# Patient Record
Sex: MALE | Race: WHITE | URBAN - METROPOLITAN AREA
[De-identification: names, ages, dates, MRNs, and addresses within clinical notes are randomized per-mention and may not be internally consistent; named-entity substitution may affect disease eponyms.]

---

## 2022-05-12 ENCOUNTER — OFFICE VISIT (OUTPATIENT)
Dept: URBAN - METROPOLITAN AREA CLINIC 26 | Facility: CLINIC | Age: 67
End: 2022-05-12
Payer: OTHER MISCELLANEOUS

## 2022-05-12 DIAGNOSIS — H25.813 COMBINED FORMS OF AGE-RELATED CATARACT, BILATERAL: Primary | ICD-10-CM

## 2022-05-12 DIAGNOSIS — H35.62 RETINAL HEMORRHAGE, LEFT EYE: ICD-10-CM

## 2022-05-12 DIAGNOSIS — H40.051 OCULAR HYPERTENSION, RIGHT EYE: ICD-10-CM

## 2022-05-12 PROCEDURE — 99204 OFFICE O/P NEW MOD 45 MIN: CPT | Performed by: OPTOMETRIST

## 2022-05-12 ASSESSMENT — INTRAOCULAR PRESSURE
OS: 12
OD: 13

## 2022-05-12 ASSESSMENT — KERATOMETRY
OD: 42.75
OS: 42.50

## 2022-05-12 ASSESSMENT — VISUAL ACUITY
OS: 20/30
OD: 20/40

## 2022-05-12 NOTE — IMPRESSION/PLAN
Impression: Combined forms of age-related cataract, bilateral: H25.813. Plan:  Discussed cataracts with patient. Discussed treatment options. Surgical treatment is recommended. Surgical risks and benefits discussed. Patient elects surgical treatment. Recommend surgery OU, OD first. Patient is candidate/interested in multifocal, toric, standard, LenSx and ORA. Aim OD: -0.25. Aim OS: -0.25. Consider MIGS. Glaucoma surgeon recommended.

## 2022-05-12 NOTE — IMPRESSION/PLAN
Impression: Retinal hemorrhage, left eye: H35.62. Plan: OCT of macula ordered and performed today ou. Macula normal ou. No DM. Cont BP  control. monitor without tx at this time.

## 2022-05-12 NOTE — IMPRESSION/PLAN
Impression: Ocular hypertension, right eye: H40.051. Plan: Pt has been using glc drops OD from outside doctor. Optic nerves appear normal ou. Cont Latanoprost 1gt qhs OD for now. Consider MIGS with Cat sx.

## 2022-07-28 ENCOUNTER — TESTING ONLY (OUTPATIENT)
Dept: URBAN - METROPOLITAN AREA CLINIC 24 | Facility: CLINIC | Age: 67
End: 2022-07-28
Payer: OTHER MISCELLANEOUS

## 2022-07-28 DIAGNOSIS — H40.051 OCULAR HYPERTENSION, RIGHT EYE: Primary | ICD-10-CM

## 2022-07-29 ENCOUNTER — ADULT PHYSICAL (OUTPATIENT)
Dept: URBAN - METROPOLITAN AREA CLINIC 24 | Facility: CLINIC | Age: 67
End: 2022-07-29
Payer: OTHER MISCELLANEOUS

## 2022-07-29 DIAGNOSIS — H25.813 COMBINED FORMS OF AGE-RELATED CATARACT, BILATERAL: Primary | ICD-10-CM

## 2022-07-29 DIAGNOSIS — Z01.818 ENCOUNTER FOR OTHER PREPROCEDURAL EXAMINATION: Primary | ICD-10-CM

## 2022-07-29 PROCEDURE — 99203 OFFICE O/P NEW LOW 30 MIN: CPT | Performed by: PHYSICIAN ASSISTANT

## 2022-07-29 ASSESSMENT — PACHYMETRY
OD: 27.81
OS: 27.57
OS: 3.62
OD: 3.61

## 2022-08-03 ENCOUNTER — PRE-OPERATIVE VISIT (OUTPATIENT)
Dept: URBAN - METROPOLITAN AREA CLINIC 24 | Facility: CLINIC | Age: 67
End: 2022-08-03
Payer: OTHER MISCELLANEOUS

## 2022-08-03 DIAGNOSIS — H35.62 RETINAL HEMORRHAGE, LEFT EYE: ICD-10-CM

## 2022-08-03 DIAGNOSIS — H52.13 MYOPIA, BILATERAL: ICD-10-CM

## 2022-08-03 DIAGNOSIS — H40.051 OCULAR HYPERTENSION, RIGHT EYE: ICD-10-CM

## 2022-08-03 DIAGNOSIS — H40.1134 PRIMARY OPEN-ANGLE GLAUCOMA, INDETERMINATE, BILATERAL: ICD-10-CM

## 2022-08-03 DIAGNOSIS — H25.813 COMBINED FORMS OF AGE-RELATED CATARACT, BILATERAL: Primary | ICD-10-CM

## 2022-08-03 DIAGNOSIS — H52.203 BILATERAL ASTIGMATISM: ICD-10-CM

## 2022-08-03 DIAGNOSIS — H52.201 ASTIGMATISM OF RIGHT EYE: ICD-10-CM

## 2022-08-03 PROCEDURE — 92020 GONIOSCOPY: CPT | Performed by: OPHTHALMOLOGY

## 2022-08-03 PROCEDURE — 99204 OFFICE O/P NEW MOD 45 MIN: CPT | Performed by: OPHTHALMOLOGY

## 2022-08-03 ASSESSMENT — VISUAL ACUITY
OS: 20/30
OD: 20/40

## 2022-08-03 ASSESSMENT — INTRAOCULAR PRESSURE
OS: 13
OD: 13
OD: 19

## 2022-08-03 NOTE — IMPRESSION/PLAN
Impression: Combined forms of age-related cataract, bilateral: H25.813. Plan: (( AIM:  - 2.00  OU: INJECTABLE OU (DEXYCU 1st choice), Epi, possible stretch, NO ORA OU, NO  LRI OU, declined AMP,    MIGS OU  (KDB 1st, Istent as backup) do not tape head, Glaucoma coverage, NOTE LENS TYPE:  [id number] ,DENSE, cortical possible trypan,  PSC)) Discussed cataract diagnosis with the patient. Appropriate testing ordered for cataract diagnosis prior to Preop. Risks and benefits of surgical treatment were discussed and understood. Patient desires surgical treatment. Discussed lens options with pt and pt is ok with wearing glasses after surgery. Patient desires surgery to proceed with surgery OU, OD  First. Patient desires near aim ou . Both eyes examined, medically necessary due to impact in activities of daily living. Discussed with pt limitations of MIGS device and it does not replace  Glaucoma eye drops and would have to continue treatment and would still need glasses after surgery. Discussed higher risks with smaller pupil and discussed iris stretch and higher risks of bleeding. Understands higher risk of complication and delayed healing due to dense cataract.

## 2022-08-03 NOTE — IMPRESSION/PLAN
Impression: Astigmatism of right eye: H52.201. Plan: Declined AMP, Discussed with patient that will need glasses after surgery. Discussed with patient, understands this may limit vision after surgery. Also discussed unmasking of astigmatism.

## 2022-08-03 NOTE — IMPRESSION/PLAN
FYI DOS - 1/24/2022  Received: Today  JUAN J Christie Ort Nurse [facility] [place]  Surgery rescheduled to 01/24/2022 from 01/10/2022.       Thanks,     Ashely      Impression: Myopia, bilateral: H52.13. Plan: Discussed signs and symptoms of retinal detachment (flashes,floaters, curtain) as precaution. Patient instructed to call or return to clinic if condition gets worse. Discussed with patient, understands this may limit vision after surgery.

## 2022-08-03 NOTE — IMPRESSION/PLAN
Impression: Primary open-angle glaucoma, indeterminate, bilateral: H40.1224. - susp for pig disp - Family Hx of Glaucoma is unknown , Denies Sulfa Allergy,  Heart or Lung Problems, Sleep Apnea,  Hx of Migraines Plan: PLAN: On Latanoprost OD QHS  , test reviewed, IOP is  higher ou, so make xal qhs ou now and so may proceed with cataract surgery with MIGS ou (KDB 1st, Istent at backup) and Discussed glaucoma may limit vision after surgery, may proceed with migs   in hopes of better iop control - understands does not eliminate meds. Discussed possible unmasking of scotoma after surgery. TESTS: Reviewed Discussed Glaucoma diagnosis in detail with patient. Emphasized and explain complaince. poor compliance can lead to Blindness.

## 2022-08-03 NOTE — IMPRESSION/PLAN
Impression: Retinal hemorrhage, left eye: H35.62. Plan: Monitor:  Discussed with patient, understands this may limit vision after surgery.

## 2022-08-10 ENCOUNTER — SURGERY (OUTPATIENT)
Dept: URBAN - METROPOLITAN AREA SURGERY 12 | Facility: SURGERY | Age: 67
End: 2022-08-10
Payer: OTHER MISCELLANEOUS

## 2022-08-10 DIAGNOSIS — H25.813 COMBINED FORMS OF AGE-RELATED CATARACT, BILATERAL: Primary | ICD-10-CM

## 2022-08-10 DIAGNOSIS — H40.1134 PRIMARY OPEN-ANGLE GLAUCOMA, BILATERAL, INDETERMINATE STAGE: ICD-10-CM

## 2022-08-11 ENCOUNTER — POST-OPERATIVE VISIT (OUTPATIENT)
Dept: URBAN - METROPOLITAN AREA CLINIC 26 | Facility: CLINIC | Age: 67
End: 2022-08-11
Payer: OTHER MISCELLANEOUS

## 2022-08-11 DIAGNOSIS — Z48.810 ENCOUNTER FOR SURGICAL AFTERCARE FOLLOWING SURGERY ON A SENSE ORGAN: Primary | ICD-10-CM

## 2022-08-11 PROCEDURE — 99024 POSTOP FOLLOW-UP VISIT: CPT | Performed by: OPTOMETRIST

## 2022-08-11 ASSESSMENT — INTRAOCULAR PRESSURE: OD: 14

## 2022-08-11 NOTE — IMPRESSION/PLAN
Impression: S/P Cataract Extraction by phacoemulsification with IOL placement; KDB OD - 1 Day. Encounter for surgical aftercare following surgery on a sense organ  Z48.950. Plan: monitor  Discontinue Lataoprost OD

## 2022-08-17 ENCOUNTER — POST-OPERATIVE VISIT (OUTPATIENT)
Dept: URBAN - METROPOLITAN AREA CLINIC 26 | Facility: CLINIC | Age: 67
End: 2022-08-17
Payer: OTHER MISCELLANEOUS

## 2022-08-17 DIAGNOSIS — H25.812 COMBINED FORMS OF AGE-RELATED CATARACT, LEFT EYE: ICD-10-CM

## 2022-08-17 DIAGNOSIS — Z48.810 ENCOUNTER FOR SURGICAL AFTERCARE FOLLOWING SURGERY ON A SENSE ORGAN: Primary | ICD-10-CM

## 2022-08-17 PROCEDURE — 99024 POSTOP FOLLOW-UP VISIT: CPT | Performed by: OPTOMETRIST

## 2022-08-17 ASSESSMENT — VISUAL ACUITY
OD: 20/25
OS: 20/25

## 2022-08-17 ASSESSMENT — INTRAOCULAR PRESSURE
OS: 10
OD: 10

## 2022-08-17 NOTE — IMPRESSION/PLAN
Impression: S/P Cataract Extraction by phacoemulsification with IOL placement; KDB OD - 7 Days. Encounter for surgical aftercare following surgery on a sense organ  Z48.810.  Plan: ok to proceed with 2nd eye

## 2022-08-25 ENCOUNTER — POST-OPERATIVE VISIT (OUTPATIENT)
Dept: URBAN - METROPOLITAN AREA CLINIC 26 | Facility: CLINIC | Age: 67
End: 2022-08-25
Payer: OTHER MISCELLANEOUS

## 2022-08-25 DIAGNOSIS — Z96.1 PRESENCE OF INTRAOCULAR LENS: Primary | ICD-10-CM

## 2022-08-25 PROCEDURE — 99024 POSTOP FOLLOW-UP VISIT: CPT | Performed by: OPTOMETRIST

## 2022-08-25 ASSESSMENT — INTRAOCULAR PRESSURE: OS: 10

## 2022-08-25 NOTE — IMPRESSION/PLAN
Impression: S/P Cataract Extraction by phacoemulsification with IOL placement/KDB OS - 1 Day. Presence of intraocular lens  Z96.1.  Plan: Monitor Discontinue Lataoprost OS

## 2022-09-01 ENCOUNTER — POST-OPERATIVE VISIT (OUTPATIENT)
Dept: URBAN - METROPOLITAN AREA CLINIC 26 | Facility: CLINIC | Age: 67
End: 2022-09-01
Payer: OTHER MISCELLANEOUS

## 2022-09-01 DIAGNOSIS — Z96.1 PRESENCE OF INTRAOCULAR LENS: Primary | ICD-10-CM

## 2022-09-01 PROCEDURE — 99024 POSTOP FOLLOW-UP VISIT: CPT | Performed by: OPTOMETRIST

## 2022-09-01 ASSESSMENT — INTRAOCULAR PRESSURE
OD: 16
OD: 21
OS: 21
OS: 18

## 2022-09-01 ASSESSMENT — VISUAL ACUITY
OS: 20/20
OD: 20/20

## 2022-09-01 NOTE — IMPRESSION/PLAN
Impression: S/P Cataract Extraction by phacoemulsification with IOL placement/KDB OS - 8 Days. Presence of intraocular lens  Z96.1. Plan: drops F/U 1 month IOP Resume  Latanoprost 1 gt QHS OU

## 2022-10-03 ENCOUNTER — POST-OPERATIVE VISIT (OUTPATIENT)
Dept: URBAN - METROPOLITAN AREA CLINIC 26 | Facility: CLINIC | Age: 67
End: 2022-10-03
Payer: OTHER MISCELLANEOUS

## 2022-10-03 DIAGNOSIS — Z48.810 ENCOUNTER FOR SURGICAL AFTERCARE FOLLOWING SURGERY ON A SENSE ORGAN: Primary | ICD-10-CM

## 2022-10-03 PROCEDURE — 99024 POSTOP FOLLOW-UP VISIT: CPT | Performed by: OPTOMETRIST

## 2022-10-03 ASSESSMENT — INTRAOCULAR PRESSURE
OD: 12
OS: 12

## 2022-10-03 ASSESSMENT — VISUAL ACUITY
OD: 20/20
OS: 20/20

## 2022-10-03 NOTE — IMPRESSION/PLAN
Impression: S/P Cataract Extraction by phacoemulsification with IOL placement/KDB OS - 40 Days. Encounter for surgical aftercare following surgery on a sense organ  Z48.810. Plan: drops. follow up with outside OD continue latanoprost 1gt qhs ou.